# Patient Record
Sex: MALE | Race: WHITE | NOT HISPANIC OR LATINO | Employment: OTHER | URBAN - METROPOLITAN AREA
[De-identification: names, ages, dates, MRNs, and addresses within clinical notes are randomized per-mention and may not be internally consistent; named-entity substitution may affect disease eponyms.]

---

## 2023-04-10 ENCOUNTER — VIRTUAL VISIT (OUTPATIENT)
Dept: UROLOGY | Facility: CLINIC | Age: 78
End: 2023-04-10
Payer: COMMERCIAL

## 2023-04-10 DIAGNOSIS — N20.0 URIC ACID NEPHROLITHIASIS: ICD-10-CM

## 2023-04-10 DIAGNOSIS — N20.0 CALCULUS OF KIDNEY: Primary | ICD-10-CM

## 2023-04-10 PROCEDURE — 99203 OFFICE O/P NEW LOW 30 MIN: CPT | Mod: VID | Performed by: UROLOGY

## 2023-04-10 RX ORDER — LISINOPRIL 10 MG/1
15 TABLET ORAL
COMMUNITY
Start: 2023-01-26

## 2023-04-10 RX ORDER — FINASTERIDE 5 MG/1
1 TABLET, FILM COATED ORAL DAILY
COMMUNITY
Start: 2023-02-01

## 2023-04-10 RX ORDER — POTASSIUM CITRATE 10 MEQ/1
10 TABLET, EXTENDED RELEASE ORAL
COMMUNITY
Start: 2023-03-23 | End: 2024-03-22

## 2023-04-10 RX ORDER — HYDRALAZINE HYDROCHLORIDE 10 MG/1
10 TABLET, FILM COATED ORAL
COMMUNITY
Start: 2022-09-07 | End: 2023-09-07

## 2023-04-10 RX ORDER — ALBUTEROL SULFATE 90 UG/1
1-2 AEROSOL, METERED RESPIRATORY (INHALATION)
COMMUNITY
Start: 2022-07-21

## 2023-04-10 RX ORDER — LIDOCAINE 50 MG/G
PATCH TOPICAL
COMMUNITY
Start: 2023-01-26

## 2023-04-10 RX ORDER — EZETIMIBE 10 MG/1
10 TABLET ORAL
COMMUNITY
Start: 2023-01-26

## 2023-04-10 RX ORDER — GLIPIZIDE 5 MG/1
5 TABLET ORAL
COMMUNITY
Start: 2023-01-26

## 2023-04-10 RX ORDER — CETIRIZINE HYDROCHLORIDE 10 MG/1
10 TABLET ORAL DAILY
COMMUNITY
Start: 2022-08-04

## 2023-04-10 ASSESSMENT — PAIN SCALES - GENERAL: PAINLEVEL: NO PAIN (0)

## 2023-04-10 NOTE — PROGRESS NOTES
Assessment/Plan:    Assessment & Plan   Ld was seen today for new patient.    Diagnoses and all orders for this visit:    Calculus of kidney    Uric acid nephrolithiasis  -     UA with Microscopic; Future  -     Urine Culture Aerobic Bacterial; Future  -     Renal panel; Future  -     Uric acid; Future        Stone Management Plan      4/10/2023    11:00 AM   Stone Management   Urinary Tract Infection No suspicion of infection   Renal Colic Well controlled symptoms   Renal Failure No suspicion of renal failure   Current CT date 3/17/2023   Right sided stones? Yes   R Number of ureteral stones No ureteral stones   R Number of kidney stones  1   R GSD of kidney stones > 20   R Hydronephrosis None   R Stone Event New event   Diagnosis date 3/17/2023   Initial location of primary symptomatic stone Renal   Initial GSD of primary symptomatic stone > 15   R Current Plan Alkalinization   Left sided stones? No   L Stone Event No current event             PLAN    Will proceed with urinary alkalinization and will return in approximately one weeks for recheck of urinary pH and medication tolerance. Patient verbalized understanding. Patient agrees with plan as discussed. Continue potassium citrate.       Video call duration: 18 minutes  Patient location in [place]: Home  Distant site (provider site): Clinic  25 minutes spent by me on the date of the encounter doing chart review, history and exam, documentation and further activities per the note    JULIAN CELIS MD  [facility] [place] [facility]    Subjective:     HPI  Mr. Ld Villasenor is a 77 year old  male who is being evaluated via a billable video visit by [facility] [place] second opinion regarding large renal stone.    He is a recurrent uric acid stone former who has required stone clearance procedures.     He presented to [facility] where previous stones have been treated with new complaint of gross  hematuria. Denies pain, fever or chills. 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 His brother in law is a former radiology technician of mine and has suggested a second opinion. He has passed many uric acid stones (some quite large). He attributes his stones to agent orange exposure.    CT scan is personally reviewed and demonstrates a very large right renal stone ~4 x 3 cm which conforms to collecting system. It is low density consistent with uric acid.    He was started on potassium citrate and is working his way up to 30 mEq BID.     Given that the stone is currently asymptomatic, it seems prudent to give a try at dissolution. If that does not progress well, could certainly perform percutaneous clearance.    Will check renal panel, uric acid and UA UC in one week.    ROS   Review of systems is negative except for HPI    No past medical history on file.    Past Surgical History:   Procedure Laterality Date     CHOLECYSTECTOMY       CYSTOSCOPY PROSTATE W/ LASER N/A 5/11/2015    Procedure: CYSTOSCOPY, TRANSURETHRAL RESECTION OF PROSTATE, BLADDER STONE LITHOTRIPSY;  Surgeon: Eugenio Munoz MD;  Location: [facility];  Service:      FOREIGN BODY REMOVAL         Current Outpatient Medications   Medication Sig Dispense Refill     albuterol (PROAIR HFA/PROVENTIL HFA/VENTOLIN HFA) 108 (90 Base) MCG/ACT inhaler Inhale 1-2 puffs into the lungs       cetirizine (ZYRTEC) 10 MG tablet Take 10 mg by mouth daily       ezetimibe (ZETIA) 10 MG tablet 10 mg       finasteride (PROSCAR) 5 MG tablet Take 1 tablet by mouth daily       glipiZIDE (GLUCOTROL) 5 MG tablet 5 mg       hydrALAZINE (APRESOLINE) 10 MG tablet Take 10 mg by mouth       lidocaine (LIDODERM) 5 % patch APPLY 1 PATCH EVERY DAY AS NEEDED FOR PAIN ** WEAR FOR 12 HOURS AND THEN REMOVE FOR 12 HOURS       lisinopril (ZESTRIL) 10 MG tablet 15 mg       potassium citrate (UROCIT-K) 10 MEQ (1080 MG) CR tablet Take 10 mEq by  mouth       PARoxetine (PAXIL) 20 MG tablet [PAROXETINE (PAXIL) 20 MG TABLET] Take 20 mg by mouth as needed.          Allergies   Allergen Reactions     Peanut [Peanut Oil] Nausea and Vomiting     Trandate [Labetalol] Unknown     On H & P       Social History     Socioeconomic History     Marital status:      Spouse name: Not on file     Number of children: Not on file     Years of education: Not on file     Highest education level: Not on file   Occupational History     Not on file   Tobacco Use     Smoking status: Former     Types: Cigarettes     Quit date: 1985     Years since quittin.2     Smokeless tobacco: Not on file   Vaping Use     Vaping status: Not on file   Substance and Sexual Activity     Alcohol use: No     Drug use: No     Sexual activity: Not on file   Other Topics Concern     Not on file   Social History Narrative     Not on file     Social Determinants of Health     Financial Resource Strain: Not on file   Food Insecurity: Not on file   Transportation Needs: Not on file   Physical Activity: Not on file   Stress: Not on file   Social Connections: Not on file   Intimate Partner Violence: Not on file   Housing Stability: Not on file       No family history on file.    Objective:     No vitals or physical exam obtained due to virtual visit    LABS  Most Recent 3 CBC's:No lab results found.  Most Recent 3 BMP's:No lab results found.  Most Recent Urinalysis:No lab results found.  Acute Labs Urine Culture  No results found for: CULTURE

## 2023-04-10 NOTE — PROGRESS NOTES
Patient is roomed via telephone for a telehealth visit.  Patient confirmed he is in the [facility] at the time of this appointment.  Patient understand that this visit is billable and agree to proceed with appointment.

## 2023-04-18 ENCOUNTER — LAB (OUTPATIENT)
Dept: LAB | Facility: HOSPITAL | Age: 78
End: 2023-04-18
Payer: COMMERCIAL

## 2023-04-18 DIAGNOSIS — N20.0 URIC ACID NEPHROLITHIASIS: ICD-10-CM

## 2023-04-18 LAB
ALBUMIN SERPL BCG-MCNC: 4.2 G/DL (ref 3.5–5.2)
ALBUMIN UR-MCNC: NEGATIVE MG/DL
ANION GAP SERPL CALCULATED.3IONS-SCNC: 9 MMOL/L (ref 7–15)
APPEARANCE UR: CLEAR
BILIRUB UR QL STRIP: NEGATIVE
BUN SERPL-MCNC: 17.4 MG/DL (ref 8–23)
CALCIUM SERPL-MCNC: 9.4 MG/DL (ref 8.8–10.2)
CHLORIDE SERPL-SCNC: 100 MMOL/L (ref 98–107)
COLOR UR AUTO: COLORLESS
CREAT SERPL-MCNC: 1.06 MG/DL (ref 0.67–1.17)
DEPRECATED HCO3 PLAS-SCNC: 26 MMOL/L (ref 22–29)
GFR SERPL CREATININE-BSD FRML MDRD: 73 ML/MIN/1.73M2
GLUCOSE SERPL-MCNC: 129 MG/DL (ref 70–99)
GLUCOSE UR STRIP-MCNC: NEGATIVE MG/DL
HGB UR QL STRIP: ABNORMAL
KETONES UR STRIP-MCNC: NEGATIVE MG/DL
LEUKOCYTE ESTERASE UR QL STRIP: ABNORMAL
NITRATE UR QL: NEGATIVE
PH UR STRIP: 7 [PH] (ref 5–7)
PHOSPHATE SERPL-MCNC: 2.9 MG/DL (ref 2.5–4.5)
POTASSIUM SERPL-SCNC: 4.3 MMOL/L (ref 3.4–5.3)
RBC URINE: 19 /HPF
SODIUM SERPL-SCNC: 135 MMOL/L (ref 136–145)
SP GR UR STRIP: 1.01 (ref 1–1.03)
URATE SERPL-MCNC: 9 MG/DL (ref 3.4–7)
UROBILINOGEN UR STRIP-MCNC: <2 MG/DL
WBC URINE: 2 /HPF

## 2023-04-18 PROCEDURE — 84550 ASSAY OF BLOOD/URIC ACID: CPT

## 2023-04-18 PROCEDURE — 81001 URINALYSIS AUTO W/SCOPE: CPT

## 2023-04-18 PROCEDURE — 36415 COLL VENOUS BLD VENIPUNCTURE: CPT

## 2023-04-18 PROCEDURE — 87086 URINE CULTURE/COLONY COUNT: CPT

## 2023-04-18 PROCEDURE — 80069 RENAL FUNCTION PANEL: CPT

## 2023-04-19 ENCOUNTER — VIRTUAL VISIT (OUTPATIENT)
Dept: UROLOGY | Facility: CLINIC | Age: 78
End: 2023-04-19
Payer: COMMERCIAL

## 2023-04-19 DIAGNOSIS — N20.0 URIC ACID NEPHROLITHIASIS: Primary | ICD-10-CM

## 2023-04-19 PROCEDURE — 99213 OFFICE O/P EST LOW 20 MIN: CPT | Mod: VID | Performed by: UROLOGY

## 2023-04-19 NOTE — PROGRESS NOTES
Assessment/Plan:    Assessment & Plan   Ld was seen today for follow up.    Diagnoses and all orders for this visit:    Uric acid nephrolithiasis  -     CT Abdomen Pelvis w/o Contrast; Future        Stone Management Plan      4/10/2023    11:00 AM 4/19/2023    11:00 AM   Stone Management   Urinary Tract Infection No suspicion of infection No suspicion of infection   Renal Colic Well controlled symptoms Asymptomatic at this time   Renal Failure No suspicion of renal failure No suspicion of renal failure   Current CT date 3/17/2023    Right sided stones? Yes    R Number of ureteral stones No ureteral stones    R Number of kidney stones  1    R GSD of kidney stones > 20    R Hydronephrosis None    R Stone Event New event No current event   Diagnosis date 3/17/2023    Initial location of primary symptomatic stone Renal    Initial GSD of primary symptomatic stone > 15    R Current Plan Alkalinization    Left sided stones? No    L Stone Event No current event Established event   L Current Plan  Alkalinization             PLAN    Video call duration: 8 minutes  Patient location in [place]: Home  Distant site (provider site): Remote  13 minutes spent by me on the date of the encounter doing chart review, history and exam, documentation and further activities per the note    JULIAN CELIS MD  [facility]    HPI  Mr. Ld Villasenor is a 77 year old  male who is being evaluated via a billable video visit by [facility] [place] for follow up of his stone disease.    Continues to take potassium citrate. A little GI upset but not too bad. Has noticed that his urine seems to be clearing up since getting on the citrate.    Urinalysis demonstrates pH is ideal for alkalinization at 7.0    He does have hyperuricemia at 9. I offered allopurinol but apparently he has a med interaction. I suggested that he discuss with his PCP.    Will recheck status of  dissolution with CT in 3 months    ROS   Review of systems is negative except for HPI.    No past medical history on file.    Past Surgical History:   Procedure Laterality Date     CHOLECYSTECTOMY       CYSTOSCOPY PROSTATE W/ LASER N/A 2015    Procedure: CYSTOSCOPY, TRANSURETHRAL RESECTION OF PROSTATE, BLADDER STONE LITHOTRIPSY;  Surgeon: Eugenio Munoz MD;  Location: [facility];  Service:      FOREIGN BODY REMOVAL         Current Outpatient Medications   Medication Sig Dispense Refill     albuterol (PROAIR HFA/PROVENTIL HFA/VENTOLIN HFA) 108 (90 Base) MCG/ACT inhaler Inhale 1-2 puffs into the lungs       cetirizine (ZYRTEC) 10 MG tablet Take 10 mg by mouth daily       ezetimibe (ZETIA) 10 MG tablet 10 mg       finasteride (PROSCAR) 5 MG tablet Take 1 tablet by mouth daily       glipiZIDE (GLUCOTROL) 5 MG tablet 5 mg       hydrALAZINE (APRESOLINE) 10 MG tablet Take 10 mg by mouth       lidocaine (LIDODERM) 5 % patch APPLY 1 PATCH EVERY DAY AS NEEDED FOR PAIN ** WEAR FOR 12 HOURS AND THEN REMOVE FOR 12 HOURS       lisinopril (ZESTRIL) 10 MG tablet 15 mg       PARoxetine (PAXIL) 20 MG tablet [PAROXETINE (PAXIL) 20 MG TABLET] Take 20 mg by mouth as needed.        potassium citrate (UROCIT-K) 10 MEQ (1080 MG) CR tablet Take 10 mEq by mouth         Allergies   Allergen Reactions     Peanut [Peanut Oil] Nausea and Vomiting     Trandate [Labetalol] Unknown     On H & P       Social History     Socioeconomic History     Marital status:      Spouse name: Not on file     Number of children: Not on file     Years of education: Not on file     Highest education level: Not on file   Occupational History     Not on file   Tobacco Use     Smoking status: Former     Types: Cigarettes     Quit date: 1985     Years since quittin.3     Smokeless tobacco: Not on file   Vaping Use     Vaping status: Not on file   Substance and Sexual Activity     Alcohol use: No     Drug use: No     Sexual activity:  Not on file   Other Topics Concern     Not on file   Social History Narrative     Not on file     Social Determinants of Health     Financial Resource Strain: Not on file   Food Insecurity: Not on file   Transportation Needs: Not on file   Physical Activity: Not on file   Stress: Not on file   Social Connections: Not on file   Intimate Partner Violence: Not on file   Housing Stability: Not on file       No family history on file.    Objective:     Appears AAO x 3  No vitals obtained due to virtual visit    Labs   Most Recent 3 CBC's:No lab results found.  Most Recent 3 BMP's:Recent Labs   Lab Test 04/18/23  1205   *   POTASSIUM 4.3   CHLORIDE 100   CO2 26   BUN 17.4   CR 1.06   ANIONGAP 9   OGNZALEZ 9.4   *     Most Recent Urinalysis:Recent Labs   Lab Test 04/18/23  1210   COLOR Colorless   APPEARANCE Clear   URINEGLC Negative   URINEBILI Negative   URINEKETONE Negative   SG 1.010   UBLD 0.1 mg/dL*   URINEPH 7.0   PROTEIN Negative   NITRITE Negative   LEUKEST 25 Tian/uL*   RBCU 19*   WBCU 2     Acute Labs Urine Culture  No results found for: CULTURE

## 2023-04-19 NOTE — PROGRESS NOTES
Patient is roomed via telephone for a telehealth visit.  Patient confirmed he is in the [facility] at the time of this appointment.  Patient understand that this visit is billable and agree to proceed with appointment.

## 2023-04-20 LAB — BACTERIA UR CULT: NORMAL
